# Patient Record
Sex: FEMALE | Race: WHITE
[De-identification: names, ages, dates, MRNs, and addresses within clinical notes are randomized per-mention and may not be internally consistent; named-entity substitution may affect disease eponyms.]

---

## 2018-03-19 ENCOUNTER — HOSPITAL ENCOUNTER (EMERGENCY)
Dept: HOSPITAL 80 - FED | Age: 68
Discharge: HOME | End: 2018-03-19
Payer: COMMERCIAL

## 2018-03-19 VITALS
DIASTOLIC BLOOD PRESSURE: 84 MMHG | HEART RATE: 78 BPM | OXYGEN SATURATION: 97 % | TEMPERATURE: 98.6 F | RESPIRATION RATE: 18 BRPM | SYSTOLIC BLOOD PRESSURE: 178 MMHG
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
## 2018-03-19 DIAGNOSIS — S62.002A: ICD-10-CM
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
## 2018-03-19 DIAGNOSIS — I10: ICD-10-CM

## 2018-03-19 DIAGNOSIS — S52.502A: Primary | ICD-10-CM

## 2018-03-19 DIAGNOSIS — F17.200: ICD-10-CM

## 2018-03-19 PROCEDURE — A4565 SLINGS: HCPCS

## 2018-03-19 NOTE — EDPHY
General


Time Seen by Provider: 03/19/18 10:10


Narrative: 





CHIEF COMPLAINT: 


Fall, left wrist pain





HISTORY OF PRESENT ILLNESS: 


Patient complains of left wrist pain status post fall.  She was hiking 

yesterday when she slipped and fell.  She fell on outward stretched left arm.  

She fell with her wrist in extension.  She felt a sudden onset of pain in the 

wrist.  She has attempted to wait to see if the pain improved.  It has gotten 

worse.  It is now severe pain.  No numbness or tingling.  No weakness.  She 

feels much better if she holds still.  Pain worsens if she moves the left wrist 

or the fingers on left hand.  No other associated complaints or modifying 

factors.  She has not take anticoagulants.





ESTABLISHED ORTHOPEDIST:


None





REVIEW OF SYSTEMS:


Ten systems reviewed and are negative unless otherwise noted in the HPI








PAST MEDICAL HISTORY: 


Hypertension without medication use





PAST SURGICAL HISTORY:


No recent surgeries





SOCIAL HISTORY:


Daily smoker.  No drug or alcohol.  She works and lives here locally as an 







FAMILY HISTORY:


Noncontributory





EXAMINATION


General Appearance:  Alert, no distress


HEENT:  Normocephalic and atraumatic.  Pupils equal round reactive


Cardiovascular:  Symmetric radial pulses 2+.  Brisk cap refill in the fingers 

left hand.


Neurological:  A&O, interossei strength symmetric.  No wrist drop on the left.  

Symmetric sensation to the dorsum of both hands and palmar surfaces both hands.


Skin:  Warm and dry, no rash.  Ecchymosis over the left wrist and hand 

dorsally.  No laceration, puncture or abrasion


Extremities:  Tenderness over the left wrist primarily at the radial head.  

Unable to test range of motion of the wrist due to pain.  There is swelling but 

no obvious step-off or deformity.  No tenderness of palpation of the left 

fingers, left elbow, or left shoulder.  She is able to straight the left elbow 

with no pain in the elbow.


Psychiatric:  Mood and affect normal








DIFFERENTIAL DIAGNOSES:


Including but not limited to radial fracture, ulnar fracture, scaphoid fracture

, dislocation, subluxation








MDM:


10:15 a.m.


Mechanical fall on outstretched arm with a distal radius compacted fracture on 

the left.  X-ray was ordered before examination and was a forearm.  I do need a 

complete wrist series, thus I have ordered the remaining views necessary to 

evaluate for scaphoid injury.  She is neuro intact with no laceration or 

puncture.  She has no pain in the left elbow or shoulder.  No head injury and 

she does not take any anticoagulants.





10:40 a.m.


Wrist views were obtained and there is a possible scaphoid fracture now visible 

as well.  Pending Radiology interpretation orthopedic consultation at this time.





11:15 a.m.


Case discussed with orthopedist Dr. Hutchison.  He will review the images and make 

recommendations at that time.





11:20 a.m.


Dr. Hutchison has reviewed the images.  He recommends a well-padded ortho glass 

thumb spica splint.  He would like to the patient this week in the clinic.





11:30 a.m.


I have re-evaluated the patient and informed her of the positive radiology 

findings.  I also discussed my conversation with Dr. Hutchison.  We discussed 

avoiding anti-inflammatories as she will likely need surgery this week.  We 

discussed pain medication as prescribed.  We discussed ice and elevation.  We 

discussed nonweightbearing to the left upper extremity keeping the splint in 

place at all times.  We discussed ED precautions and she is comfortable with 

this plan.  She is discharged home stable condition, neurovascular intact at 

this time








SUPERVISION:


Patient was independently examined, but I discussed the case with my secondary 

supervising physician Dr. Bergeron








ED Precautions: 


Worsening pain. Erythema, edema, cyanosis, pallor, paresthesia or anesthesia.








- Diagnostics


Imaging Results: 


 Imaging Impressions





Forearm X-Ray  03/19/18 09:54


Impression:


1. Transverse nondisplaced distal left radial metaphyseal fracture.








Wrist X-Ray  03/19/18 10:19


Impression:  


1. Favor artifact versus incomplete nondisplaced navicular fracture. Recommend 

follow-up radiograph of the wrist in 10-14 days.


2. Acute nondisplaced intra-articular distal radius fracture.














- History


Smoking Status: Current every day smoker





- Objective


Vital Signs: 


 Initial Vital Signs











Temperature (C)  98.1 F   03/19/18 09:50


 


Heart Rate  101 H  03/19/18 09:50


 


Respiratory Rate  16   03/19/18 09:50


 


O2 Sat (%)  94   03/19/18 09:50








 











O2 Delivery Mode               Room Air














Allergies/Adverse Reactions: 


 





No Known Allergies Allergy (Unverified 03/19/18 09:49)


 








Home Medications: 














 Medication  Instructions  Recorded


 


oxyCODONE HCL/ACETAMINOPHEN 1 each PO Q4-6PRN PRN #11 tablet 03/19/18





[Percocet 5-325 mg Tablet]  














Departure





- Departure


Disposition: Home, Routine, Self-Care


Clinical Impression: 


Distal radius fracture, left


Qualifiers:


 Encounter type: initial encounter Fracture type: closed Fracture morphology: 

unspecified fracture morphology Qualified Code(s): S52.502A - Unspecified 

fracture of the lower end of left radius, initial encounter for closed fracture





Fracture of scaphoid of left wrist


Qualifiers:


 Encounter type: initial encounter Scaphoid bone location: unspecified portion 

of scaphoid Fracture type: closed Fracture alignment: nondisplaced Qualified 

Code(s): S62.002A - Unspecified fracture of navicular [scaphoid] bone of left 

wrist, initial encounter for closed fracture





Condition: Good


Instructions:  Oxycodone/Acetaminophen (By mouth), Wrist Fracture in Adults (ED)


Additional Instructions: 


1. Keep your splint in place at all times until seen by orthopedist


2. Sling for comfort as needed.  Removed periodically for shoulder range of 

motion and elbow range of motion


3. Pain medication as prescribed as needed


4. Contact the on-call orthopedist Dr. Hutchison as provided


5. ED precautions as discussed for sensory, motor changes or increasing pain


Referrals: 


Melissa Hutchison MD [Medical Doctor] - As per Instructions


Prescriptions: 


oxyCODONE HCL/ACETAMINOPHEN [Percocet 5-325 mg Tablet] 1 each PO Q4-6PRN PRN #

11 tablet


 PRN Reason: Pain, Breakthrough